# Patient Record
Sex: FEMALE | Race: ASIAN | NOT HISPANIC OR LATINO | URBAN - METROPOLITAN AREA
[De-identification: names, ages, dates, MRNs, and addresses within clinical notes are randomized per-mention and may not be internally consistent; named-entity substitution may affect disease eponyms.]

---

## 2017-05-06 ENCOUNTER — EMERGENCY (EMERGENCY)
Facility: HOSPITAL | Age: 37
LOS: 1 days | Discharge: HOME | End: 2017-05-06
Attending: EMERGENCY MEDICINE | Admitting: EMERGENCY MEDICINE
Payer: MEDICAID

## 2017-05-06 VITALS
SYSTOLIC BLOOD PRESSURE: 147 MMHG | TEMPERATURE: 99 F | HEART RATE: 88 BPM | OXYGEN SATURATION: 100 % | RESPIRATION RATE: 14 BRPM | DIASTOLIC BLOOD PRESSURE: 80 MMHG

## 2017-05-06 VITALS
RESPIRATION RATE: 18 BRPM | DIASTOLIC BLOOD PRESSURE: 67 MMHG | SYSTOLIC BLOOD PRESSURE: 105 MMHG | TEMPERATURE: 98 F | HEART RATE: 83 BPM | OXYGEN SATURATION: 97 %

## 2017-05-06 DIAGNOSIS — F10.129 ALCOHOL ABUSE WITH INTOXICATION, UNSPECIFIED: ICD-10-CM

## 2017-05-06 DIAGNOSIS — S32.028A OTHER FRACTURE OF SECOND LUMBAR VERTEBRA, INITIAL ENCOUNTER FOR CLOSED FRACTURE: ICD-10-CM

## 2017-05-06 DIAGNOSIS — M54.5 LOW BACK PAIN: ICD-10-CM

## 2017-05-06 LAB — HCG SERPL-ACNC: <1 MIU/ML — SIGNIFICANT CHANGE UP

## 2017-05-06 PROCEDURE — 99053 MED SERV 10PM-8AM 24 HR FAC: CPT

## 2017-05-06 PROCEDURE — 99285 EMERGENCY DEPT VISIT HI MDM: CPT | Mod: 25

## 2017-05-06 PROCEDURE — 72131 CT LUMBAR SPINE W/O DYE: CPT | Mod: 26

## 2017-05-06 RX ORDER — HALOPERIDOL DECANOATE 100 MG/ML
5 INJECTION INTRAMUSCULAR ONCE
Qty: 0 | Refills: 0 | Status: COMPLETED | OUTPATIENT
Start: 2017-05-06 | End: 2017-05-06

## 2017-05-06 RX ADMIN — Medication 2 MILLIGRAM(S): at 03:45

## 2017-05-06 RX ADMIN — HALOPERIDOL DECANOATE 5 MILLIGRAM(S): 100 INJECTION INTRAMUSCULAR at 03:45

## 2017-05-06 NOTE — ED PROVIDER NOTE - OBJECTIVE STATEMENT
37 y/o F with Hx of "thyroid disorder" BIBA for alcohol intoxication after allegedly becoming too intoxicated at a bar tonight. Pt endorses drinking ETOH and states she slipped on the floor, falling onto her lower back. She endorses aching lower back pain but is refusing pain meds. States she wants to "get in and get out of here as quick as possible" and denies having any other acute medical complaints at this time.    Denies head injury, headache, dizziness, LOC, neck pain, CP, SOB, palpitations, abdo pain, N/V/D

## 2017-05-06 NOTE — ED PROVIDER NOTE - PROGRESS NOTE DETAILS
Pt uncooperative and repeatedly attempting to elope from ED. Ativan/Haldol ordered for safety of pt and staff. pt now alert and awake, know about results of CT lumbar

## 2017-05-06 NOTE — ED PROVIDER NOTE - ATTENDING CONTRIBUTION TO CARE
Pt BIBEMS for AMS, alcohol intoxication suspected. Pt has erratic behavior, uncooperative w exam. Requiring sedation. Complaining of back pain. Ativan/Haldol. Will reassess

## 2017-05-06 NOTE — ED PROVIDER NOTE - CARE PLAN
Principal Discharge DX:	Vertebral fracture, osteoporotic, initial encounter  Secondary Diagnosis:	Alcohol intoxication

## 2017-05-06 NOTE — ED PROVIDER NOTE - MEDICAL DECISION MAKING DETAILS
CT spine reveals minimally displaced fx of transverse process of L2. Will continue to observe for sobriety, reassess and provide pt with copy of CT scan on CD with Ortho F/U.

## 2021-03-08 PROBLEM — Z00.00 ENCOUNTER FOR PREVENTIVE HEALTH EXAMINATION: Status: ACTIVE | Noted: 2021-03-08

## 2021-03-09 PROBLEM — E07.9 DISORDER OF THYROID, UNSPECIFIED: Chronic | Status: ACTIVE | Noted: 2017-05-06

## 2021-03-11 ENCOUNTER — APPOINTMENT (OUTPATIENT)
Dept: OTOLARYNGOLOGY | Facility: CLINIC | Age: 41
End: 2021-03-11
Payer: MEDICAID

## 2021-03-11 VITALS — HEIGHT: 63 IN | BODY MASS INDEX: 24.27 KG/M2 | TEMPERATURE: 96.6 F | WEIGHT: 137 LBS

## 2021-03-11 DIAGNOSIS — Z83.3 FAMILY HISTORY OF DIABETES MELLITUS: ICD-10-CM

## 2021-03-11 DIAGNOSIS — E03.9 HYPOTHYROIDISM, UNSPECIFIED: ICD-10-CM

## 2021-03-11 DIAGNOSIS — Z86.59 PERSONAL HISTORY OF OTHER MENTAL AND BEHAVIORAL DISORDERS: ICD-10-CM

## 2021-03-11 DIAGNOSIS — Z72.89 OTHER PROBLEMS RELATED TO LIFESTYLE: ICD-10-CM

## 2021-03-11 DIAGNOSIS — Z81.8 FAMILY HISTORY OF OTHER MENTAL AND BEHAVIORAL DISORDERS: ICD-10-CM

## 2021-03-11 DIAGNOSIS — J34.2 DEVIATED NASAL SEPTUM: ICD-10-CM

## 2021-03-11 DIAGNOSIS — Z78.9 OTHER SPECIFIED HEALTH STATUS: ICD-10-CM

## 2021-03-11 DIAGNOSIS — Z82.49 FAMILY HISTORY OF ISCHEMIC HEART DISEASE AND OTHER DISEASES OF THE CIRCULATORY SYSTEM: ICD-10-CM

## 2021-03-11 PROCEDURE — 31235 NSL/SINS NDSC DX SPHN SINUSC: CPT

## 2021-03-11 PROCEDURE — 99204 OFFICE O/P NEW MOD 45 MIN: CPT | Mod: 25

## 2021-03-11 PROCEDURE — 99072 ADDL SUPL MATRL&STAF TM PHE: CPT

## 2021-03-11 RX ORDER — ZOLPIDEM TARTRATE 10 MG/1
10 TABLET, FILM COATED ORAL
Refills: 0 | Status: ACTIVE | COMMUNITY

## 2021-03-11 RX ORDER — IPRATROPIUM BROMIDE 42 UG/1
0.06 SPRAY NASAL 3 TIMES DAILY
Qty: 30 | Refills: 3 | Status: ACTIVE | COMMUNITY
Start: 2021-03-11 | End: 1900-01-01

## 2021-03-11 RX ORDER — LEVOCETIRIZINE DIHYDROCHLORIDE 5 MG/1
TABLET, FILM COATED ORAL
Refills: 0 | Status: ACTIVE | COMMUNITY

## 2021-03-11 RX ORDER — LEVOTHYROXINE SODIUM 137 UG/1
TABLET ORAL
Refills: 0 | Status: ACTIVE | COMMUNITY

## 2021-03-11 RX ORDER — METFORMIN HYDROCHLORIDE 500 MG/1
500 TABLET, COATED ORAL
Refills: 0 | Status: ACTIVE | COMMUNITY

## 2021-03-11 RX ORDER — SPIRONOLACTONE 100 MG/1
100 TABLET, FILM COATED ORAL
Refills: 0 | Status: ACTIVE | COMMUNITY

## 2021-03-24 ENCOUNTER — RESULT REVIEW (OUTPATIENT)
Age: 41
End: 2021-03-24

## 2021-03-24 ENCOUNTER — APPOINTMENT (OUTPATIENT)
Dept: CT IMAGING | Facility: CLINIC | Age: 41
End: 2021-03-24

## 2021-04-08 ENCOUNTER — APPOINTMENT (OUTPATIENT)
Dept: OTOLARYNGOLOGY | Facility: CLINIC | Age: 41
End: 2021-04-08

## 2021-04-15 ENCOUNTER — APPOINTMENT (OUTPATIENT)
Dept: OTOLARYNGOLOGY | Facility: CLINIC | Age: 41
End: 2021-04-15
Payer: MEDICAID

## 2021-04-15 DIAGNOSIS — J34.89 OTHER SPECIFIED DISORDERS OF NOSE AND NASAL SINUSES: ICD-10-CM

## 2021-04-15 DIAGNOSIS — J34.3 HYPERTROPHY OF NASAL TURBINATES: ICD-10-CM

## 2021-04-15 PROCEDURE — 99214 OFFICE O/P EST MOD 30 MIN: CPT

## 2021-04-15 PROCEDURE — 99072 ADDL SUPL MATRL&STAF TM PHE: CPT

## 2021-04-16 PROBLEM — J34.2 DNS (DEVIATED NASAL SEPTUM): Status: ACTIVE | Noted: 2021-04-16

## 2021-04-16 PROBLEM — J34.89 SINUS PRESSURE: Status: ACTIVE | Noted: 2021-03-11

## 2021-04-16 PROBLEM — J34.3 HYPERTROPHY OF BOTH INFERIOR NASAL TURBINATES: Status: ACTIVE | Noted: 2021-04-16

## 2021-04-16 NOTE — ASSESSMENT
[FreeTextEntry1] : she has a constellation of symptoms.\par We talked about observation, another course of medical treatment versus bilateral image guided endoscopic sinus surgery, inferior turbinate submucous resection and right mando bullosa plasty.\par \par I did review her CT scan with her which demonstrated bilateral moderate worse on the right and left ethmoid sinus opacification. Near complete opacification of the right maxillary outflow tract. Bilateral mild maxillary mucosal thickening. A right-sided mando bullosa there is partial opacified. Frontal recess inflammation bilaterally. Her sphenoid sinuses are clear.\par \par Risks and benefits discussed were discussed which included, but were not limited to the risks of general anesthesia, bleeding and infection,  septal perforation, skull base injury (possibly requiring follow up neuro-surgery for repair), nasolacrimal duct injury, orbital injury, blindness. \par The importance of postoperative care was also discussed. The patient will consider her options and surgical planning will proceed.\par  \par Did talk about postoperative care and variable outcomes of surgery.

## 2021-04-16 NOTE — PROCEDURE
[FreeTextEntry6] : PROCEDURE NOTES  PROCEDURE: Nasal endoscopy  SURGEON: Dr. Osuna INDICATIONS: Assess for chronic sinusitis.  ANESTHESIA: The patient was placed in a sitting position.  Following application of the topical anesthetic and decongestant, exam was performed with a zero degree endoscope.  The scope was passed along the right nasal floor to the nasopharynx.  It was then passed into the region of the middle meatus, middle turbinate, and sphenoethmoid region.  An identical procedure was performed on the left side.  The following findings were noted:  The nasal mucosa was healthy appearing and the septum was roughly midline. The middle meatus and sphenoethmoid recesses were clear bilaterally. The nasopharynx was normal.

## 2021-04-16 NOTE — HISTORY OF PRESENT ILLNESS
[de-identified] : She comes in followup today.\par She reports that she attempted a trial of Atrovent but she was unable to tolerate secondary to burning.\par Her sister is a physician recommended a trial Flonase which was not able to use secondary to burning.\par \par She continues to complain of pressure and discomfort in her intercanthus lateral dorsum area.\par She complains of bilateral nasal congestion.\par She is a waxing and waning sense of smell.\par She does report that when she had called the last year she had decreased sense of smell and is not fully return but does complain of sense of smell that waxes and wanes is different.\par \par She also has intermittent frontal headaches.\par \par It is common for her to take antibiotics for sinus infections. She last took a course of Zithromax several months ago.\par \par She does not tolerate oral steroids secondary to the psychotropic effects and the weight gain.

## 2021-04-16 NOTE — HISTORY OF PRESENT ILLNESS
[de-identified] : Initial visit.\par She is a consultation. She is concern for poor nasal airway bilaterally.She describes thick nasal secretions. She reports a waxing and waning sense of smell. She is burning in her nose. She feels at times her nose  does not stop running. She also feels pressure in the ethmoid area.

## 2021-04-16 NOTE — PROCEDURE
[FreeTextEntry6] : PROCEDURE NOTES\par \par PROCEDURE: Nasal endoscopy \par SURGEON: Dr. Osuna\par INDICATIONS: Assess for chronic sinusitis. \par ANESTHESIA: The patient was placed in a sitting position.  Following application of the topical anesthetic and decongestant, exam was performed with a zero degree endoscope.  The scope was passed along the right nasal floor to the nasopharynx.  It was then passed into the region of the middle meatus, middle turbinate, and sphenoethmoid region.  An identical procedure was performed on the left side.  The following findings were noted:\par \par The nasal mucosa was healthy appearing and the septum is S-shaped.  Hyperplastic turbinates. The middle meatus and sphenoethmoid recesses were clear bilaterally. The nasopharynx was normal. \par

## 2021-04-16 NOTE — ASSESSMENT
[FreeTextEntry1] : Will give her a trial of Atrovent for vasomotor rhinitis.\par Based on her history of recurrent sinus problems I will send her for CT scan of her paranasal sinuses and see her in followup.

## 2021-05-06 ENCOUNTER — APPOINTMENT (OUTPATIENT)
Dept: OTOLARYNGOLOGY | Facility: CLINIC | Age: 41
End: 2021-05-06

## 2021-05-17 ENCOUNTER — TRANSCRIPTION ENCOUNTER (OUTPATIENT)
Age: 41
End: 2021-05-17

## 2021-05-18 ENCOUNTER — TRANSCRIPTION ENCOUNTER (OUTPATIENT)
Age: 41
End: 2021-05-18

## 2021-05-18 DIAGNOSIS — J32.2 CHRONIC ETHMOIDAL SINUSITIS: ICD-10-CM

## 2021-05-18 RX ORDER — DOXYCYCLINE 100 MG/1
100 CAPSULE ORAL
Qty: 20 | Refills: 0 | Status: ACTIVE | COMMUNITY
Start: 2021-05-18 | End: 1900-01-01

## 2021-05-19 ENCOUNTER — OUTPATIENT (OUTPATIENT)
Dept: OUTPATIENT SERVICES | Facility: HOSPITAL | Age: 41
LOS: 1 days | Discharge: HOME | End: 2021-05-19
Payer: MEDICAID

## 2021-05-19 ENCOUNTER — RESULT REVIEW (OUTPATIENT)
Age: 41
End: 2021-05-19

## 2021-05-19 ENCOUNTER — APPOINTMENT (OUTPATIENT)
Dept: OTOLARYNGOLOGY | Facility: AMBULATORY SURGERY CENTER | Age: 41
End: 2021-05-19

## 2021-05-19 LAB
GRAM STN FLD: SIGNIFICANT CHANGE UP
SPECIMEN SOURCE: SIGNIFICANT CHANGE UP

## 2021-05-19 PROCEDURE — 31267 ENDOSCOPY MAXILLARY SINUS: CPT | Mod: 50

## 2021-05-19 PROCEDURE — 31255 NSL/SINS NDSC W/TOT ETHMDCT: CPT | Mod: 50,59

## 2021-05-19 PROCEDURE — 31276 NSL/SINS NDSC FRNT TISS RMVL: CPT | Mod: 50

## 2021-05-19 PROCEDURE — 61782 SCAN PROC CRANIAL EXTRA: CPT

## 2021-05-19 PROCEDURE — 88311 DECALCIFY TISSUE: CPT | Mod: 26

## 2021-05-19 PROCEDURE — 88305 TISSUE EXAM BY PATHOLOGIST: CPT | Mod: 26

## 2021-05-19 PROCEDURE — 30140 RESECT INFERIOR TURBINATE: CPT | Mod: 50

## 2021-05-19 PROCEDURE — 31240 NSL/SNS NDSC CNCH BULL RESCJ: CPT | Mod: RT

## 2021-05-19 PROCEDURE — 30520 REPAIR OF NASAL SEPTUM: CPT

## 2021-05-19 PROCEDURE — 88300 SURGICAL PATH GROSS: CPT | Mod: 26,59

## 2021-05-23 LAB
-  CEFAZOLIN: SIGNIFICANT CHANGE UP
-  CLINDAMYCIN: SIGNIFICANT CHANGE UP
-  ERYTHROMYCIN: SIGNIFICANT CHANGE UP
-  LINEZOLID: SIGNIFICANT CHANGE UP
-  OXACILLIN: SIGNIFICANT CHANGE UP
-  RIFAMPIN: SIGNIFICANT CHANGE UP
-  TRIMETHOPRIM/SULFAMETHOXAZOLE: SIGNIFICANT CHANGE UP
-  VANCOMYCIN: SIGNIFICANT CHANGE UP
CULTURE RESULTS: SIGNIFICANT CHANGE UP
METHOD TYPE: SIGNIFICANT CHANGE UP
ORGANISM # SPEC MICROSCOPIC CNT: SIGNIFICANT CHANGE UP
ORGANISM # SPEC MICROSCOPIC CNT: SIGNIFICANT CHANGE UP
SPECIMEN SOURCE: SIGNIFICANT CHANGE UP

## 2021-05-26 LAB — SURGICAL PATHOLOGY STUDY: SIGNIFICANT CHANGE UP

## 2021-05-27 ENCOUNTER — APPOINTMENT (OUTPATIENT)
Dept: OTOLARYNGOLOGY | Facility: CLINIC | Age: 41
End: 2021-05-27

## 2021-06-03 ENCOUNTER — APPOINTMENT (OUTPATIENT)
Dept: OTOLARYNGOLOGY | Facility: CLINIC | Age: 41
End: 2021-06-03
Payer: MEDICAID

## 2021-06-03 DIAGNOSIS — Z98.890 OTHER SPECIFIED POSTPROCEDURAL STATES: ICD-10-CM

## 2021-06-03 PROCEDURE — 99024 POSTOP FOLLOW-UP VISIT: CPT

## 2021-06-03 PROCEDURE — 31237 NSL/SINS NDSC SURG BX POLYPC: CPT | Mod: 50,58

## 2021-06-03 RX ORDER — BUDESONIDE 0.5 MG/2ML
0.5 INHALANT ORAL
Qty: 1 | Refills: 0 | Status: ACTIVE | COMMUNITY
Start: 2021-06-03 | End: 1900-01-01

## 2021-06-04 PROBLEM — Z98.890 S/P FESS (FUNCTIONAL ENDOSCOPIC SINUS SURGERY): Status: ACTIVE | Noted: 2021-06-04

## 2021-06-04 NOTE — REASON FOR VISIT
[de-identified] : 15 [de-identified] : She reports initial dramatic improvement especially with smell.  This has diminished some.\par She completed oral steroids.  She has a few more days of abx.\par \par PROCEDURE:  NASAL ENDOSCOPY WITH SURGICAL DEBRIDEMENT\par \par Surgeon: Dr. Osuna\par Indication: status post surgery \par Anesthetic: Topical lidocaine and Afrin\par Procedure: The patient was placed in a sitting position.  Following application of the topical anesthetic and decongestant, exam was performed with a flexible fiberoptic scope followed by a rigid endoscope.  The scope was passed along the right nasal floor to the nasopharynx.  It was then passed into the region of the middle meatus, middle turbinate, and sphenoethmoid region.  An identical procedure was performed on the left side. Debridement was performed with the 0 deg endoscope and sinus instruments. The following findings were noted:\par \par Nasal mucosa:  Mild to moderate edema\par Clots and small crusts in nasal cavities causing obstruction.\par Septum: straight. No perforation. Healing well. \par Turbinates: inferior turbinates healing well. Crusting on anterior ends. \par Right nasal cavity- suctioned and debrided\par      Right middle meatus: edema \par      Right sphenoethmoidal recess: no pus, polyps or congestion \par Left nasal cavity- suctioned and debrided\par      Left middle meatus: edema\par      Left sphenoethmoidal recess: no pus, polyps or congestion. \par Clinically looks well.\par Will start Neilmed BID with budesonide.\par fu 2-3 weeks.

## 2021-06-17 ENCOUNTER — APPOINTMENT (OUTPATIENT)
Dept: OTOLARYNGOLOGY | Facility: CLINIC | Age: 41
End: 2021-06-17

## 2021-06-24 ENCOUNTER — APPOINTMENT (OUTPATIENT)
Dept: OTOLARYNGOLOGY | Facility: CLINIC | Age: 41
End: 2021-06-24
Payer: MEDICAID

## 2021-06-24 DIAGNOSIS — R43.0 ANOSMIA: ICD-10-CM

## 2021-06-24 PROCEDURE — 99024 POSTOP FOLLOW-UP VISIT: CPT

## 2021-06-25 NOTE — PROCEDURE
[FreeTextEntry6] : PROCEDURE NOTES\par \par PROCEDURE: Nasal endoscopy \par SURGEON: Dr. Osuna\par INDICATIONS: Assess for chronic sinusitis. \par ANESTHESIA: The patient was placed in a sitting position.  Following application of the topical anesthetic and decongestant, exam was performed with a zero degree endoscope.  The scope was passed along the right nasal floor to the nasopharynx.  It was then passed into the region of the middle meatus, middle turbinate, and sphenoethmoid region.  An identical procedure was performed on the left side.  The following findings were noted:\par \par The nasal mucosa was healthy appearing and the septum was roughly midline. The middle meatus and sphenoethmoid recesses were clear bilaterally. The nasopharynx was normal. \par  \par There is no evidence of an infection.  A cx was taken of her right ethmoid cavity.\par There is no edema or polyps or polypoid edema.\par There is no anterior nasal lesion.\par She has moderated left MT lateralization.\par There is no evidence of a CSF leak by provocation test.

## 2021-06-25 NOTE — ASSESSMENT
[FreeTextEntry1] : She is a clinical dilemma at this time.\par She looks quite healthy on exam, but does not feel well.\par I can not explain the burning.\par I will follow up her cx.\par There is no evidence of CSF leak.\par There is no evidence of an infection.\par I have urged her to start budesonide rinses.\par She will also be given a short burst of oral steroids.  We had a lengthy discussion regarding the psychotropic effects.  She knows to stop the steroids if she can not tolerated them.  She will also call me. We will continue to work hard to get her to feel better as she did after surgery.\par \par fu one month.\par Pending her clinical course she may require repeat imaging.

## 2021-06-25 NOTE — HISTORY OF PRESENT ILLNESS
[de-identified] : She comes in today complaining that her sense of smell and taste that returned after surgery has now significantly diminished.\par She also complains of pain on the right anterior nasal airway.\par She also reports burning in her nose.\par She also reports that she intermittently notices "water" come out of her right nose.  She does not have a headache or a salty taste. She is irrigating.  \par She has not used the budesonide rinses that I recommended.

## 2021-06-29 RX ORDER — PREDNISONE 10 MG/1
10 TABLET ORAL
Qty: 9 | Refills: 0 | Status: ACTIVE | COMMUNITY
Start: 2021-06-29 | End: 1900-01-01

## 2021-06-30 LAB — BACTERIA FLD CULT: NORMAL

## 2021-07-01 RX ORDER — METHYLPREDNISOLONE 4 MG/1
4 TABLET ORAL
Qty: 1 | Refills: 0 | Status: ACTIVE | COMMUNITY
Start: 2021-05-18 | End: 1900-01-01

## 2021-07-22 ENCOUNTER — APPOINTMENT (OUTPATIENT)
Dept: OTOLARYNGOLOGY | Facility: CLINIC | Age: 41
End: 2021-07-22

## 2021-08-12 ENCOUNTER — APPOINTMENT (OUTPATIENT)
Dept: OTOLARYNGOLOGY | Facility: CLINIC | Age: 41
End: 2021-08-12
Payer: MEDICAID

## 2021-08-12 VITALS — HEIGHT: 63 IN | WEIGHT: 137 LBS | TEMPERATURE: 97.8 F | BODY MASS INDEX: 24.27 KG/M2

## 2021-08-12 PROCEDURE — 99024 POSTOP FOLLOW-UP VISIT: CPT

## 2021-08-17 NOTE — HISTORY OF PRESENT ILLNESS
[de-identified] : She has been using Budesonide in Neilmed for the last 2 months.\par She has really turned the corner.\par She reports return to excellent smell and taste.\par She reports an improved nasal airway.\par She also reports decrease in facial discomfort.

## 2021-08-17 NOTE — PROCEDURE
[FreeTextEntry6] : PROCEDURE NOTES\par \par PROCEDURE: Nasal endoscopy \par SURGEON: Dr. Osuna\par INDICATIONS: Assess for chronic sinusitis. \par ANESTHESIA: The patient was placed in a sitting position.  Following application of the topical anesthetic and decongestant, exam was performed with a zero degree endoscope.  The scope was passed along the right nasal floor to the nasopharynx.  It was then passed into the region of the middle meatus, middle turbinate, and sphenoethmoid region.  An identical procedure was performed on the left side.  The following findings were noted:\par \par The nasal mucosa was healthy appearing and the septum was roughly midline. The middle meatus and sphenoethmoid recesses were clear bilaterally. The nasopharynx was normal. \par

## 2021-08-17 NOTE — ASSESSMENT
[FreeTextEntry1] : Clinically doing very well.\par I have renewed her script for Budesonide.\par fu in 3-4 months.

## 2021-09-15 NOTE — ED PROVIDER NOTE - CONDUCTED A DETAILED DISCUSSION WITH PATIENT AND/OR GUARDIAN REGARDING, MDM
CC:  Brigida Bateman is here today for abdominal pain.    Medications: medications verified and updated  Refills needed today?  NO  denies Latex allergy or sensitivity  Patient would like communication of their results via:      Cell Phone:   Telephone Information:   Mobile [phone]     Okay to leave a message containing results? Yes   Reviewed overdue health maintenance topics with patient.             radiology results/need for outpatient follow-up/return to ED if symptoms worsen, persist or questions arise

## 2021-10-07 ENCOUNTER — APPOINTMENT (OUTPATIENT)
Dept: OTOLARYNGOLOGY | Facility: CLINIC | Age: 41
End: 2021-10-07

## 2021-10-28 ENCOUNTER — APPOINTMENT (OUTPATIENT)
Dept: OTOLARYNGOLOGY | Facility: CLINIC | Age: 41
End: 2021-10-28
Payer: MEDICAID

## 2021-10-28 VITALS — WEIGHT: 136 LBS | HEIGHT: 63 IN | BODY MASS INDEX: 24.1 KG/M2 | TEMPERATURE: 97.1 F

## 2021-10-28 DIAGNOSIS — J30.0 VASOMOTOR RHINITIS: ICD-10-CM

## 2021-10-28 DIAGNOSIS — J32.9 CHRONIC SINUSITIS, UNSPECIFIED: ICD-10-CM

## 2021-10-28 PROCEDURE — 31231 NASAL ENDOSCOPY DX: CPT

## 2021-10-28 PROCEDURE — 99212 OFFICE O/P EST SF 10 MIN: CPT | Mod: 25

## 2021-11-01 PROBLEM — J30.0 VASOMOTOR RHINITIS: Status: ACTIVE | Noted: 2021-03-11

## 2021-11-01 PROBLEM — J32.9 CHRONIC SINUSITIS: Status: ACTIVE | Noted: 2021-06-04

## 2021-11-01 NOTE — HISTORY OF PRESENT ILLNESS
[de-identified] : She comes in today. She continues to use twice daily topical therapy. She reports that she now feels that her left nasal airway has taken a step back.

## 2021-11-01 NOTE — ASSESSMENT
[FreeTextEntry1] : She looks better than she feels at this time.\par I will follow up her culture result.\par She reports that she recently had a few CT scan secondary to her head trauma. She will get a copy of those studies for my review. She may repeat need repeat sinus imaging.

## 2021-11-01 NOTE — PROCEDURE
[FreeTextEntry6] : PROCEDURE NOTES\par \par PROCEDURE: Nasal endoscopy \par SURGEON: Dr. Osuna\par INDICATIONS: Assess for chronic sinusitis. \par ANESTHESIA: The patient was placed in a sitting position.  Following application of the topical anesthetic and decongestant, exam was performed with a zero degree endoscope.  The scope was passed along the right nasal floor to the nasopharynx.  It was then passed into the region of the middle meatus, middle turbinate, and sphenoethmoid region.  An identical procedure was performed on the left side.  The following findings were noted:\par \par The nasal mucosa was healthy appearing and the septum was roughly midline. The middle meatus and sphenoethmoid recesses were clear bilaterally. The nasopharynx was normal. \par  \par she does have some slight stenosis of her leftmiddle turbinate to her left lateral nasal wall.\par \par A culture was taken.

## 2021-11-03 LAB — EAR NOSE AND THROAT CULTURE: ABNORMAL

## 2021-11-08 ENCOUNTER — TRANSCRIPTION ENCOUNTER (OUTPATIENT)
Age: 41
End: 2021-11-08

## 2021-11-17 ENCOUNTER — APPOINTMENT (OUTPATIENT)
Dept: ORTHOPEDIC SURGERY | Facility: CLINIC | Age: 41
End: 2021-11-17

## 2021-11-22 ENCOUNTER — RESULT REVIEW (OUTPATIENT)
Age: 41
End: 2021-11-22

## 2021-11-22 ENCOUNTER — OUTPATIENT (OUTPATIENT)
Dept: OUTPATIENT SERVICES | Facility: HOSPITAL | Age: 41
LOS: 1 days | End: 2021-11-22

## 2021-11-22 ENCOUNTER — APPOINTMENT (OUTPATIENT)
Dept: CT IMAGING | Facility: CLINIC | Age: 41
End: 2021-11-22
Payer: MEDICAID

## 2021-11-22 PROCEDURE — 70486 CT MAXILLOFACIAL W/O DYE: CPT | Mod: 26

## 2021-11-30 ENCOUNTER — APPOINTMENT (OUTPATIENT)
Dept: OTOLARYNGOLOGY | Facility: CLINIC | Age: 41
End: 2021-11-30
Payer: MEDICAID

## 2021-11-30 PROCEDURE — 99213 OFFICE O/P EST LOW 20 MIN: CPT | Mod: 95

## 2021-12-05 NOTE — HISTORY OF PRESENT ILLNESS
[Home] : at home, [unfilled] , at the time of the visit. [Medical Office: ([facility])___] : at the medical office located in  [Verbal consent obtained from patient] : the patient, [unfilled] [de-identified] : She continues to complain of right sided sinus symptoms.\par Her left side feels well.\par I explained that she has stenosis of her left MT to the lateral nasal wall that is causing obstruction and maxillary and frontal sinus disease.\par \par She has had persistent topical and systemic and comprehensive medical care for the last several months.\par \par I am recommending in-office image guided left maxillary and frontal sinuplasty with correction of stenosis.\par \par Risks and benefits discussed were discussed which included, but were not limited to the risks of general anesthesia, bleeding and infection,  septal perforation, skull base injury (possibly requiring follow up neuro-surgery for repair), nasolacrimal duct injury, orbital injury, blindness. \par The importance of postoperative care was also discussed. The patient will consider her options and surgical planning will proceed.\par

## 2021-12-13 ENCOUNTER — TRANSCRIPTION ENCOUNTER (OUTPATIENT)
Age: 41
End: 2021-12-13

## 2021-12-16 ENCOUNTER — APPOINTMENT (OUTPATIENT)
Dept: OTOLARYNGOLOGY | Facility: CLINIC | Age: 41
End: 2021-12-16
Payer: MEDICAID

## 2021-12-16 PROCEDURE — 31295Z: CUSTOM

## 2021-12-16 PROCEDURE — 61782 SCAN PROC CRANIAL EXTRA: CPT

## 2021-12-16 PROCEDURE — 31296Z: CUSTOM

## 2021-12-28 RX ORDER — CETIRIZINE HYDROCHLORIDE 10 MG/1
10 TABLET, COATED ORAL
Qty: 30 | Refills: 0 | Status: ACTIVE | COMMUNITY
Start: 2021-06-08

## 2021-12-28 RX ORDER — TRETINOIN 1 MG/G
0.1 GEL TOPICAL
Qty: 20 | Refills: 0 | Status: ACTIVE | COMMUNITY
Start: 2021-11-21

## 2021-12-28 RX ORDER — NORTRIPTYLINE HYDROCHLORIDE 10 MG/1
10 CAPSULE ORAL
Qty: 30 | Refills: 0 | Status: ACTIVE | COMMUNITY
Start: 2021-11-24

## 2021-12-28 RX ORDER — IBUPROFEN 600 MG/1
600 TABLET, FILM COATED ORAL
Qty: 30 | Refills: 0 | Status: ACTIVE | COMMUNITY
Start: 2021-09-15

## 2021-12-28 RX ORDER — CHOLECALCIFEROL (VITAMIN D3) 1250 MCG
1.25 MG CAPSULE ORAL
Qty: 4 | Refills: 0 | Status: ACTIVE | COMMUNITY
Start: 2021-10-16

## 2021-12-28 RX ORDER — GABAPENTIN 100 MG/1
100 CAPSULE ORAL
Qty: 90 | Refills: 0 | Status: ACTIVE | COMMUNITY
Start: 2021-09-29

## 2021-12-28 RX ORDER — LEVOTHYROXINE SODIUM 0.07 MG/1
75 TABLET ORAL
Qty: 30 | Refills: 0 | Status: ACTIVE | COMMUNITY
Start: 2020-10-18

## 2021-12-28 RX ORDER — SPIRONOLACTONE 50 MG/1
50 TABLET ORAL
Qty: 60 | Refills: 0 | Status: ACTIVE | COMMUNITY
Start: 2020-10-04

## 2021-12-28 RX ORDER — CHLORHEXIDINE GLUCONATE, 0.12% ORAL RINSE 1.2 MG/ML
0.12 SOLUTION DENTAL
Qty: 473 | Refills: 0 | Status: ACTIVE | COMMUNITY
Start: 2021-09-15

## 2021-12-28 RX ORDER — TIZANIDINE 2 MG/1
2 TABLET ORAL
Qty: 90 | Refills: 0 | Status: ACTIVE | COMMUNITY
Start: 2021-09-29

## 2021-12-30 ENCOUNTER — APPOINTMENT (OUTPATIENT)
Dept: OTOLARYNGOLOGY | Facility: CLINIC | Age: 41
End: 2021-12-30

## 2022-01-06 ENCOUNTER — APPOINTMENT (OUTPATIENT)
Dept: OTOLARYNGOLOGY | Facility: CLINIC | Age: 42
End: 2022-01-06
Payer: MEDICAID

## 2022-01-06 DIAGNOSIS — J32.1 CHRONIC FRONTAL SINUSITIS: ICD-10-CM

## 2022-01-06 DIAGNOSIS — J32.0 CHRONIC MAXILLARY SINUSITIS: ICD-10-CM

## 2022-01-06 PROCEDURE — 99213 OFFICE O/P EST LOW 20 MIN: CPT | Mod: 25

## 2022-01-06 PROCEDURE — 31231 NASAL ENDOSCOPY DX: CPT

## 2022-01-10 PROBLEM — J32.0 CHRONIC MAXILLARY SINUSITIS: Status: ACTIVE | Noted: 2021-12-05

## 2022-01-10 PROBLEM — J32.1 CHRONIC FRONTAL SINUSITIS: Status: ACTIVE | Noted: 2021-12-05

## 2022-01-10 NOTE — ASSESSMENT
[FreeTextEntry1] : She had a partial response to the in office opening of her left middle meatus.\par At this point her only option is for an operating room repair of stenosis of middle turbinates of the lateral nasal wall and opening up the left middle meatal antrostomy, frontal sinus outflow tract and anterior ethmoid cells.\par All and only left sided.\par \par Risks and benefits discussed were discussed which included, but were not limited to the risks of general anesthesia, bleeding and infection,  septal perforation, skull base injury (possibly requiring follow up neuro-surgery for repair), nasolacrimal duct injury, orbital injury, blindness. \par The importance of postoperative care was also discussed. The patient will consider her options and surgical planning will proceed.\par

## 2022-01-10 NOTE — PROCEDURE
[FreeTextEntry6] : PROCEDURE NOTES\par \par PROCEDURE: Nasal endoscopy \par SURGEON: Dr. Osuna\par INDICATIONS: Assess for chronic sinusitis. \par ANESTHESIA: The patient was placed in a sitting position.  Following application of the topical anesthetic and decongestant, exam was performed with a zero degree endoscope.  The scope was passed along the right nasal floor to the nasopharynx.  It was then passed into the region of the middle meatus, middle turbinate, and sphenoethmoid region.  An identical procedure was performed on the left side.  The following findings were noted:\par \par The nasal mucosa was healthy appearing and the septum was roughly midline. She still has some stenosis from her left middle turbinate. I do see themaxillary sinus ostium. No evidence of infection.

## 2022-01-20 ENCOUNTER — APPOINTMENT (OUTPATIENT)
Dept: OTOLARYNGOLOGY | Facility: CLINIC | Age: 42
End: 2022-01-20

## 2022-02-01 ENCOUNTER — TRANSCRIPTION ENCOUNTER (OUTPATIENT)
Age: 42
End: 2022-02-01

## 2022-02-02 ENCOUNTER — APPOINTMENT (OUTPATIENT)
Dept: OTOLARYNGOLOGY | Facility: AMBULATORY SURGERY CENTER | Age: 42
End: 2022-02-02

## 2022-02-02 ENCOUNTER — OUTPATIENT (OUTPATIENT)
Dept: OUTPATIENT SERVICES | Facility: HOSPITAL | Age: 42
LOS: 1 days | Discharge: HOME | End: 2022-02-02
Payer: MEDICAID

## 2022-02-02 LAB — GLUCOSE BLDC GLUCOMTR-MCNC: 84 MG/DL — SIGNIFICANT CHANGE UP (ref 70–99)

## 2022-02-02 PROCEDURE — 31256 EXPLORATION MAXILLARY SINUS: CPT

## 2022-02-02 PROCEDURE — 31254 NSL/SINS NDSC W/PRTL ETHMDCT: CPT

## 2022-02-02 PROCEDURE — 31276 NSL/SINS NDSC FRNT TISS RMVL: CPT

## 2022-02-02 PROCEDURE — 61782 SCAN PROC CRANIAL EXTRA: CPT

## 2022-02-02 DEVICE — SEEKER BLN EM SPHN 7X7MM: Type: IMPLANTABLE DEVICE | Status: FUNCTIONAL

## 2022-02-02 DEVICE — SEEKR BLN EM FRNT 70 DEG 7X7MM: Type: IMPLANTABLE DEVICE | Status: FUNCTIONAL

## 2022-02-02 DEVICE — STENT SINUS DRUG ELUDING PROPEL CONTOUR: Type: IMPLANTABLE DEVICE | Status: FUNCTIONAL

## 2022-02-02 DEVICE — STENT DRUG ELUTING SINUS INTERSECT ENT PROPEL MINI 16MM: Type: IMPLANTABLE DEVICE | Status: FUNCTIONAL

## 2022-02-02 DEVICE — SEEKER BLN EM MAXL 7X7MM: Type: IMPLANTABLE DEVICE | Status: FUNCTIONAL

## 2022-02-03 ENCOUNTER — TRANSCRIPTION ENCOUNTER (OUTPATIENT)
Age: 42
End: 2022-02-03

## 2022-02-10 ENCOUNTER — APPOINTMENT (OUTPATIENT)
Dept: OTOLARYNGOLOGY | Facility: CLINIC | Age: 42
End: 2022-02-10
Payer: MEDICAID

## 2022-02-10 DIAGNOSIS — J32.4 CHRONIC PANSINUSITIS: ICD-10-CM

## 2022-02-10 PROCEDURE — 31237 NSL/SINS NDSC SURG BX POLYPC: CPT

## 2022-02-10 PROCEDURE — 99024 POSTOP FOLLOW-UP VISIT: CPT

## 2022-02-14 PROBLEM — J32.4 CHRONIC PANSINUSITIS: Status: ACTIVE | Noted: 2021-12-28

## 2022-02-14 NOTE — REASON FOR VISIT
[de-identified] : 7 [de-identified] : Now 8 days status post revision left sided endoscopic sinus surgery.\par She has no specific complaints. She is using Neilmed nasal irrigation.\par \par PROCEDURE:  NASAL ENDOSCOPY WITH SURGICAL DEBRIDEMENT\par \par Surgeon: Dr. Osuna\par Indication: status post surgery \par Anesthetic: Topical lidocaine and Afrin\par Procedure: The patient was placed in a sitting position.  Following application of the topical anesthetic and decongestant, exam was performed with a flexible fiberoptic scope followed by a rigid endoscope.  The scope was passed along the right nasal floor to the nasopharynx.  It was then passed into the region of the middle meatus, middle turbinate, and sphenoethmoid region.  An identical procedure was performed on the left side. Debridement was performed with the 0 deg endoscope and sinus instruments. The following findings were noted:\par \par Nasal mucosa:  Mild to moderate edema\par Clots and small crusts in nasal cavities causing obstruction.\par Septum: straight. No perforation. Healing well. \par Turbinates: inferior turbinates healing well. Crusting on anterior ends. \par \par Left nasal cavity- suctioned and debrided\par      Left middle meatus: edema\par      Left sphenoethmoidal recess: no pus, polyps or congestion. \par \par Clinically quite stable. No evidence of stenosis or infection. Continue topical therapy. Follow up with me in one month.

## 2022-02-26 ENCOUNTER — TRANSCRIPTION ENCOUNTER (OUTPATIENT)
Age: 42
End: 2022-02-26

## 2022-03-10 ENCOUNTER — APPOINTMENT (OUTPATIENT)
Dept: OTOLARYNGOLOGY | Facility: CLINIC | Age: 42
End: 2022-03-10

## 2022-03-15 ENCOUNTER — TRANSCRIPTION ENCOUNTER (OUTPATIENT)
Age: 42
End: 2022-03-15

## 2023-02-24 ENCOUNTER — NON-APPOINTMENT (OUTPATIENT)
Age: 43
End: 2023-02-24

## (undated) DEVICE — MEDTRONIC AXIEM PATIENT TRACKER NON-INVASIVE

## (undated) DEVICE — BLADE MEDTRONIC ENT FUSION QUADCUT ROTATABLE STRAIGHT 4.3MM X 13CM

## (undated) DEVICE — NDL SPINAL 25G X 3.5" (BLUE)

## (undated) DEVICE — DRSG SPLINT INTRA NASAL .5MM STANDARD THICK

## (undated) DEVICE — PACK RHINOPLASTY

## (undated) DEVICE — MEDTRONIC INFLATOR KIT FOR NUVENT EM SINUS DILATION SYSTEM

## (undated) DEVICE — BLADE MEDTRONIC ENT RAD 40 DEGREE ROTATABLE 4MM X 11CM

## (undated) DEVICE — SYR LUER LOK 3CC

## (undated) DEVICE — CATH IV SAFE INSYTE 18G X 1.16" (GREEN)

## (undated) DEVICE — VENODYNE/SCD SLEEVE CALF MEDIUM

## (undated) DEVICE — GLV 7.5 PROTEXIS (WHITE)

## (undated) DEVICE — TUBING IRR CYCLONE SINANASAL

## (undated) DEVICE — DRSG GAUZE SPONGE 2X2" STERILE

## (undated) DEVICE — WARMING BLANKET LOWER ADULT

## (undated) DEVICE — SYR LUER LOK 50CC

## (undated) DEVICE — STOPCOCK 4-WAY

## (undated) DEVICE — TUBING IRRIGATION STRAIGHT SHOT

## (undated) DEVICE — TUBING SUCTION NONCONDUCTIVE 6MM X 12FT

## (undated) DEVICE — SYR ASEPTO

## (undated) DEVICE — BLADE MEDTRONIC ENT INFERIOR TURBINATE ROTATABLE STRAIGHT 2MM X11CM

## (undated) DEVICE — PACK NASAL MEROGEL 4X4CM

## (undated) DEVICE — KNIFE ALCON CRESCENT ANGLED BEVEL UP 2.3MM (PINK)

## (undated) DEVICE — SUCTION CATH ARGYLE DELEE TIP 10FR STRAIGHT PACKED

## (undated) DEVICE — MEDTRONIC INSTRUMENT TRACKER ENT

## (undated) DEVICE — PETRI DISH MED 3.5"